# Patient Record
Sex: MALE | Race: WHITE | NOT HISPANIC OR LATINO | URBAN - METROPOLITAN AREA
[De-identification: names, ages, dates, MRNs, and addresses within clinical notes are randomized per-mention and may not be internally consistent; named-entity substitution may affect disease eponyms.]

---

## 2023-05-11 ASSESSMENT — PROMIS GLOBAL HEALTH SCALE
RATE_AVERAGE_FATIGUE: MODERATE
RATE_PHYSICAL_HEALTH: FAIR
CARRYOUT_PHYSICAL_ACTIVITIES: COMPLETELY
CARRYOUT_SOCIAL_ACTIVITIES: GOOD
RATE_GENERAL_HEALTH: GOOD
RATE_MENTAL_HEALTH: GOOD
RATE_QUALITY_OF_LIFE: GOOD
EMOTIONAL_PROBLEMS: OFTEN
RATE_AVERAGE_PAIN: 2
RATE_SOCIAL_SATISFACTION: VERY GOOD

## 2023-05-12 ENCOUNTER — OFFICE VISIT (OUTPATIENT)
Dept: PRIMARY CARE | Facility: CLINIC | Age: 28
End: 2023-05-12
Payer: COMMERCIAL

## 2023-05-12 VITALS — SYSTOLIC BLOOD PRESSURE: 120 MMHG | HEART RATE: 64 BPM | WEIGHT: 156 LBS | DIASTOLIC BLOOD PRESSURE: 80 MMHG

## 2023-05-12 DIAGNOSIS — Z00.00 HEALTH CARE MAINTENANCE: Primary | ICD-10-CM

## 2023-05-12 DIAGNOSIS — F41.8 DEPRESSION WITH ANXIETY: ICD-10-CM

## 2023-05-12 DIAGNOSIS — R07.9 CHEST PAIN, UNSPECIFIED TYPE: ICD-10-CM

## 2023-05-12 PROCEDURE — 90715 TDAP VACCINE 7 YRS/> IM: CPT | Performed by: INTERNAL MEDICINE

## 2023-05-12 PROCEDURE — 99395 PREV VISIT EST AGE 18-39: CPT | Performed by: INTERNAL MEDICINE

## 2023-05-12 PROCEDURE — 90471 IMMUNIZATION ADMIN: CPT | Performed by: INTERNAL MEDICINE

## 2023-05-12 PROCEDURE — 1036F TOBACCO NON-USER: CPT | Performed by: INTERNAL MEDICINE

## 2023-05-12 RX ORDER — FINASTERIDE 1 MG/1
1 TABLET, FILM COATED ORAL DAILY
COMMUNITY

## 2023-05-12 ASSESSMENT — ENCOUNTER SYMPTOMS
SLEEP DISTURBANCE: 0
RHINORRHEA: 0
TROUBLE SWALLOWING: 0
PHOTOPHOBIA: 0
DIARRHEA: 0
ABDOMINAL PAIN: 0
PALPITATIONS: 0
BLOOD IN STOOL: 0
WHEEZING: 0
NECK STIFFNESS: 0
EYE ITCHING: 0
EYE PAIN: 0
SEIZURES: 0
FLANK PAIN: 0
COUGH: 0
CHEST TIGHTNESS: 0
ACTIVITY CHANGE: 0
ANAL BLEEDING: 0
EYE REDNESS: 0
COLOR CHANGE: 0
VOMITING: 0
ARTHRALGIAS: 0
SHORTNESS OF BREATH: 0
EYE DISCHARGE: 0
ADENOPATHY: 0
CONSTIPATION: 0
STRIDOR: 0
CHOKING: 0
DIAPHORESIS: 0
SPEECH DIFFICULTY: 0
FREQUENCY: 0
BACK PAIN: 0
CHILLS: 0
POLYDIPSIA: 0
MYALGIAS: 0
DIZZINESS: 0
DYSURIA: 0
HEADACHES: 0
WOUND: 0
SORE THROAT: 0
FATIGUE: 0
NUMBNESS: 0
VOICE CHANGE: 0
POLYPHAGIA: 0
BRUISES/BLEEDS EASILY: 0
NAUSEA: 0
ABDOMINAL DISTENTION: 0
LIGHT-HEADEDNESS: 0
SINUS PAIN: 0
NECK PAIN: 0
FACIAL SWELLING: 0
TREMORS: 0
FACIAL ASYMMETRY: 0
JOINT SWELLING: 0
SINUS PRESSURE: 0
RECTAL PAIN: 0
HEMATURIA: 0
DIFFICULTY URINATING: 0
WEAKNESS: 0
APPETITE CHANGE: 0

## 2023-05-12 NOTE — PROGRESS NOTES
Subjective   Patient ID: Noe Martinez is a 28 y.o. adult who presents for Annual Exam.    Patient presents for physical exam.  He has been compliant with his diet and exercise  He has been complaining of intermittent chest pain which is positional over the past few months.  He denies any exertional chest pain or shortness of breath..  He reports no palpitations.  He reports  pain in his mid sternum with deep bends or moves a certain way.  He otherwise feels okay.  He denies any headaches, no dizziness, sinus problems, no chest pain or shortness of breath.  Denies abdominal pain no nausea vomiting or diarrhea.  Reports no skin rashes or any new musculoskeletal complaints.  He is taking Propecia for hair loss.  He is expectinga son in December.  He reports no symptoms from depression or anxiety.    Follow-up.Me      Review of Systems   Constitutional:  Negative for activity change, appetite change, chills, diaphoresis and fatigue.   HENT:  Negative for congestion, dental problem, drooling, ear discharge, ear pain, facial swelling, hearing loss, mouth sores, nosebleeds, postnasal drip, rhinorrhea, sinus pressure, sinus pain, sneezing, sore throat, tinnitus, trouble swallowing and voice change.    Eyes:  Negative for photophobia, pain, discharge, redness, itching and visual disturbance.   Respiratory:  Negative for cough, choking, chest tightness, shortness of breath, wheezing and stridor.    Cardiovascular:  Positive for chest pain. Negative for palpitations and leg swelling.   Gastrointestinal:  Negative for abdominal distention, abdominal pain, anal bleeding, blood in stool, constipation, diarrhea, nausea, rectal pain and vomiting.   Endocrine: Negative for cold intolerance, heat intolerance, polydipsia, polyphagia and polyuria.   Genitourinary:  Negative for decreased urine volume, difficulty urinating, dysuria, enuresis, flank pain, frequency, genital sores, hematuria, penile discharge and urgency.    Musculoskeletal:  Negative for arthralgias, back pain, gait problem, joint swelling, myalgias, neck pain and neck stiffness.   Skin:  Negative for color change, pallor, rash and wound.   Neurological:  Negative for dizziness, tremors, seizures, syncope, facial asymmetry, speech difficulty, weakness, light-headedness, numbness and headaches.   Hematological:  Negative for adenopathy. Does not bruise/bleed easily.   Psychiatric/Behavioral:  Negative for sleep disturbance.        Objective   /80   Pulse 64   Wt 70.8 kg (156 lb)     Physical Exam  Constitutional:       General: Noe Martinez is not in acute distress.     Appearance: Normal appearance. Noe Martinez is normal weight. Noe Martinez is not ill-appearing, toxic-appearing or diaphoretic.   HENT:      Head: Normocephalic and atraumatic.      Right Ear: Tympanic membrane, ear canal and external ear normal. There is no impacted cerumen.      Left Ear: Tympanic membrane, ear canal and external ear normal. There is no impacted cerumen.      Nose: Nose normal. No congestion or rhinorrhea.      Mouth/Throat:      Mouth: Mucous membranes are dry.      Pharynx: Oropharynx is clear. No oropharyngeal exudate or posterior oropharyngeal erythema.   Eyes:      General: No scleral icterus.        Right eye: No discharge.         Left eye: No discharge.      Extraocular Movements: Extraocular movements intact.      Pupils: Pupils are equal, round, and reactive to light.   Neck:      Vascular: No carotid bruit.   Cardiovascular:      Rate and Rhythm: Normal rate and regular rhythm.      Heart sounds: No murmur heard.     No friction rub. No gallop.   Pulmonary:      Effort: Pulmonary effort is normal. No respiratory distress.      Breath sounds: Normal breath sounds. No stridor. No wheezing, rhonchi or rales.   Chest:      Chest wall: No tenderness.   Abdominal:      General: There is no distension.      Palpations: There is no mass.      Tenderness: There is no  abdominal tenderness. There is no right CVA tenderness, left CVA tenderness or guarding.      Hernia: No hernia is present.   Genitourinary:     Penis: Normal.       Testes: Normal.   Musculoskeletal:         General: No swelling, tenderness, deformity or signs of injury.      Cervical back: No rigidity or tenderness.      Right lower leg: No edema.      Left lower leg: No edema.   Lymphadenopathy:      Cervical: No cervical adenopathy.   Skin:     Coloration: Skin is not jaundiced or pale.      Findings: No bruising, erythema, lesion or rash.   Neurological:      General: No focal deficit present.      Mental Status: Noe Martinez is alert and oriented to person, place, and time.      Cranial Nerves: No cranial nerve deficit.      Sensory: No sensory deficit.      Motor: No weakness.      Coordination: Coordination normal.      Gait: Gait normal.      Deep Tendon Reflexes: Reflexes normal.   Psychiatric:         Mood and Affect: Mood normal.         Behavior: Behavior normal.         Thought Content: Thought content normal.         Judgment: Judgment normal.         Assessment/Plan   Diagnoses and all orders for this visit:  Health care maintenance-we will give a tetanus shot today.  Patient educated about diet and exercise.  He will schedule dermatology appointment dental appointment has been done  -     Albumin , Urine Random; Future  -     Vitamin D, Total; Future  -     CBC and Auto Differential; Future  -     Comprehensive Metabolic Panel; Future  -     TSH with reflex to Free T4 if abnormal; Future  -     Uric Acid; Future  -     Urinalysis with Reflex Microscopic; Future  -     Lipid Panel; Future  -     Tdap vaccine, age 10 years and older  (BOOSTRIX)  Chest pain, unspecified type-chest pain-we will check an x-ray.  -     XR chest 2 views; Future  Depression with anxiety-he denies any symptoms

## 2023-05-12 NOTE — PATIENT INSTRUCTIONS
Please obtain an x-ray.  Obtain fasting blood work and urine.  Follow-up in 1 year.  You will be notified of your results.  Diet and exercise.  Schedule dermatology appointment.

## 2023-05-19 ENCOUNTER — LAB (OUTPATIENT)
Dept: LAB | Facility: LAB | Age: 28
End: 2023-05-19
Payer: COMMERCIAL

## 2023-05-19 DIAGNOSIS — Z00.00 HEALTH CARE MAINTENANCE: ICD-10-CM

## 2023-05-19 LAB
ALANINE AMINOTRANSFERASE (SGPT) (U/L) IN SER/PLAS: 12 U/L (ref 10–52)
ALBUMIN (G/DL) IN SER/PLAS: 4.8 G/DL (ref 3.4–5)
ALBUMIN (MG/L) IN URINE: <7 MG/L
ALBUMIN/CREATININE (UG/MG) IN URINE: ABNORMAL UG/MG CRT (ref 0–30)
ALKALINE PHOSPHATASE (U/L) IN SER/PLAS: 43 U/L (ref 33–120)
ANION GAP IN SER/PLAS: 10 MMOL/L (ref 10–20)
APPEARANCE, URINE: CLEAR
ASPARTATE AMINOTRANSFERASE (SGOT) (U/L) IN SER/PLAS: 15 U/L (ref 9–39)
BASOPHILS (10*3/UL) IN BLOOD BY AUTOMATED COUNT: 0.06 X10E9/L (ref 0–0.1)
BASOPHILS/100 LEUKOCYTES IN BLOOD BY AUTOMATED COUNT: 1.3 % (ref 0–2)
BILIRUBIN TOTAL (MG/DL) IN SER/PLAS: 0.8 MG/DL (ref 0–1.2)
BILIRUBIN, URINE: NEGATIVE
BLOOD, URINE: NEGATIVE
CALCIDIOL (25 OH VITAMIN D3) (NG/ML) IN SER/PLAS: 28 NG/ML
CALCIUM (MG/DL) IN SER/PLAS: 9.8 MG/DL (ref 8.6–10.3)
CARBON DIOXIDE, TOTAL (MMOL/L) IN SER/PLAS: 30 MMOL/L (ref 21–32)
CHLORIDE (MMOL/L) IN SER/PLAS: 103 MMOL/L (ref 98–107)
CHOLESTEROL (MG/DL) IN SER/PLAS: 148 MG/DL (ref 0–199)
CHOLESTEROL IN HDL (MG/DL) IN SER/PLAS: 52.1 MG/DL
CHOLESTEROL/HDL RATIO: 2.8
COLOR, URINE: COLORLESS
CREATININE (MG/DL) IN SER/PLAS: 0.8 MG/DL (ref 0.5–1.3)
CREATININE (MG/DL) IN URINE: 13.2 MG/DL (ref 20–370)
EOSINOPHILS (10*3/UL) IN BLOOD BY AUTOMATED COUNT: 0.08 X10E9/L (ref 0–0.7)
EOSINOPHILS/100 LEUKOCYTES IN BLOOD BY AUTOMATED COUNT: 1.8 % (ref 0–6)
ERYTHROCYTE DISTRIBUTION WIDTH (RATIO) BY AUTOMATED COUNT: 12.1 % (ref 11.5–14.5)
ERYTHROCYTE MEAN CORPUSCULAR HEMOGLOBIN CONCENTRATION (G/DL) BY AUTOMATED: 33.6 G/DL (ref 32–36)
ERYTHROCYTE MEAN CORPUSCULAR VOLUME (FL) BY AUTOMATED COUNT: 88 FL (ref 80–100)
ERYTHROCYTES (10*6/UL) IN BLOOD BY AUTOMATED COUNT: 4.84 X10E12/L (ref 4.5–5.9)
GFR MALE: >90 ML/MIN/1.73M2
GLUCOSE (MG/DL) IN SER/PLAS: 74 MG/DL (ref 74–99)
GLUCOSE, URINE: NEGATIVE MG/DL
HEMATOCRIT (%) IN BLOOD BY AUTOMATED COUNT: 42.6 % (ref 41–52)
HEMOGLOBIN (G/DL) IN BLOOD: 14.3 G/DL (ref 13.5–17.5)
IMMATURE GRANULOCYTES/100 LEUKOCYTES IN BLOOD BY AUTOMATED COUNT: 0.2 % (ref 0–0.9)
KETONES, URINE: NEGATIVE MG/DL
LDL: 86 MG/DL (ref 0–99)
LEUKOCYTE ESTERASE, URINE: NEGATIVE
LEUKOCYTES (10*3/UL) IN BLOOD BY AUTOMATED COUNT: 4.5 X10E9/L (ref 4.4–11.3)
LYMPHOCYTES (10*3/UL) IN BLOOD BY AUTOMATED COUNT: 2.2 X10E9/L (ref 1.2–4.8)
LYMPHOCYTES/100 LEUKOCYTES IN BLOOD BY AUTOMATED COUNT: 49.3 % (ref 13–44)
MONOCYTES (10*3/UL) IN BLOOD BY AUTOMATED COUNT: 0.37 X10E9/L (ref 0.1–1)
MONOCYTES/100 LEUKOCYTES IN BLOOD BY AUTOMATED COUNT: 8.3 % (ref 2–10)
NEUTROPHILS (10*3/UL) IN BLOOD BY AUTOMATED COUNT: 1.74 X10E9/L (ref 1.2–7.7)
NEUTROPHILS/100 LEUKOCYTES IN BLOOD BY AUTOMATED COUNT: 39.1 % (ref 40–80)
NITRITE, URINE: NEGATIVE
PH, URINE: 7 (ref 5–8)
PLATELETS (10*3/UL) IN BLOOD AUTOMATED COUNT: 216 X10E9/L (ref 150–450)
POTASSIUM (MMOL/L) IN SER/PLAS: 4.7 MMOL/L (ref 3.5–5.3)
PROTEIN TOTAL: 6.9 G/DL (ref 6.4–8.2)
PROTEIN, URINE: NEGATIVE MG/DL
SODIUM (MMOL/L) IN SER/PLAS: 138 MMOL/L (ref 136–145)
SPECIFIC GRAVITY, URINE: 1 (ref 1–1.03)
THYROTROPIN (MIU/L) IN SER/PLAS BY DETECTION LIMIT <= 0.05 MIU/L: 1.22 MIU/L (ref 0.44–3.98)
TRIGLYCERIDE (MG/DL) IN SER/PLAS: 52 MG/DL (ref 0–149)
URATE (MG/DL) IN SER/PLAS: 5.3 MG/DL (ref 4–7.5)
UREA NITROGEN (MG/DL) IN SER/PLAS: 13 MG/DL (ref 6–23)
UROBILINOGEN, URINE: <2 MG/DL (ref 0–1.9)
VLDL: 10 MG/DL (ref 0–40)

## 2023-05-19 PROCEDURE — 80053 COMPREHEN METABOLIC PANEL: CPT

## 2023-05-19 PROCEDURE — 36415 COLL VENOUS BLD VENIPUNCTURE: CPT

## 2023-05-19 PROCEDURE — 81003 URINALYSIS AUTO W/O SCOPE: CPT

## 2023-05-19 PROCEDURE — 85025 COMPLETE CBC W/AUTO DIFF WBC: CPT

## 2023-05-19 PROCEDURE — 82570 ASSAY OF URINE CREATININE: CPT

## 2023-05-19 PROCEDURE — 84443 ASSAY THYROID STIM HORMONE: CPT

## 2023-05-19 PROCEDURE — 82306 VITAMIN D 25 HYDROXY: CPT

## 2023-05-19 PROCEDURE — 80061 LIPID PANEL: CPT

## 2023-05-19 PROCEDURE — 82043 UR ALBUMIN QUANTITATIVE: CPT

## 2023-05-19 PROCEDURE — 84550 ASSAY OF BLOOD/URIC ACID: CPT

## 2023-07-14 ENCOUNTER — OFFICE VISIT (OUTPATIENT)
Dept: PRIMARY CARE | Facility: CLINIC | Age: 28
End: 2023-07-14
Payer: COMMERCIAL

## 2023-07-14 VITALS — HEIGHT: 71 IN | WEIGHT: 150 LBS | BODY MASS INDEX: 21 KG/M2

## 2023-07-14 DIAGNOSIS — M94.0 COSTOCHONDRITIS: Primary | ICD-10-CM

## 2023-07-14 PROCEDURE — 99213 OFFICE O/P EST LOW 20 MIN: CPT | Performed by: INTERNAL MEDICINE

## 2023-07-14 PROCEDURE — 1036F TOBACCO NON-USER: CPT | Performed by: INTERNAL MEDICINE

## 2023-07-14 RX ORDER — METHYLPREDNISOLONE 4 MG/1
TABLET ORAL
Qty: 21 TABLET | Refills: 0 | Status: SHIPPED | OUTPATIENT
Start: 2023-07-14 | End: 2023-07-21

## 2023-07-14 SDOH — HEALTH STABILITY: PHYSICAL HEALTH: ON AVERAGE, HOW MANY MINUTES DO YOU ENGAGE IN EXERCISE AT THIS LEVEL?: 30 MIN

## 2023-07-14 SDOH — HEALTH STABILITY: PHYSICAL HEALTH: ON AVERAGE, HOW MANY DAYS PER WEEK DO YOU ENGAGE IN MODERATE TO STRENUOUS EXERCISE (LIKE A BRISK WALK)?: 3 DAYS

## 2023-07-14 ASSESSMENT — LIFESTYLE VARIABLES
AUDIT-C TOTAL SCORE: 4
HOW MANY STANDARD DRINKS CONTAINING ALCOHOL DO YOU HAVE ON A TYPICAL DAY: 1 OR 2
HOW OFTEN DO YOU HAVE A DRINK CONTAINING ALCOHOL: 4 OR MORE TIMES A WEEK
HOW OFTEN DO YOU HAVE SIX OR MORE DRINKS ON ONE OCCASION: NEVER
SKIP TO QUESTIONS 9-10: 1

## 2023-07-14 NOTE — PROGRESS NOTES
"Subjective   Patient ID: Noe Martinez is a 28 y.o. adult who presents for Follow-up.    Patient presents for follow-up.  He continues to complain of chest wall pain with certain movements.  He denies exertional chest pain or shortness of breath.  Symptoms only occur with movement.  He states that his chest wall is tender.  He denies any headaches, no dizziness, no chest pain or shortness of breath.  He denies abdominal pain no nausea vomiting or diarrhea.  He reports no new musculoskeletal complaints.         Review of Systems   Cardiovascular:  Positive for chest pain.       Objective   Ht 1.803 m (5' 11\")   Wt 68 kg (150 lb)   BMI 20.92 kg/m²     Physical Exam  Constitutional:       Appearance: Normal appearance.   Cardiovascular:      Rate and Rhythm: Normal rate and regular rhythm.      Heart sounds: No murmur heard.     No gallop.   Pulmonary:      Effort: No respiratory distress.      Breath sounds: No wheezing or rales.   Abdominal:      General: There is no distension.      Palpations: There is no mass.      Tenderness: There is no abdominal tenderness. There is no guarding.   Musculoskeletal:      Right lower leg: No edema.      Left lower leg: No edema.   Neurological:      Mental Status: Noe Martinez is alert.     Tenderness over chest wall.  Reproduces symptoms    Assessment/Plan     .  Diagnoses and all orders for this visit:  Costochondritis  -     methylPREDNISolone (Medrol Dospak) 4 mg tablets; Take as directed on package.  Health maintenance-physical exam has been done.       "

## 2024-04-12 ENCOUNTER — OFFICE VISIT (OUTPATIENT)
Dept: PRIMARY CARE | Facility: CLINIC | Age: 29
End: 2024-04-12
Payer: COMMERCIAL

## 2024-04-12 VITALS
HEART RATE: 52 BPM | SYSTOLIC BLOOD PRESSURE: 118 MMHG | HEIGHT: 72 IN | BODY MASS INDEX: 19.91 KG/M2 | DIASTOLIC BLOOD PRESSURE: 76 MMHG | WEIGHT: 147 LBS

## 2024-04-12 DIAGNOSIS — M54.50 RIGHT LOW BACK PAIN, UNSPECIFIED CHRONICITY, UNSPECIFIED WHETHER SCIATICA PRESENT: ICD-10-CM

## 2024-04-12 DIAGNOSIS — Z00.00 HEALTH CARE MAINTENANCE: Primary | ICD-10-CM

## 2024-04-12 PROCEDURE — 1036F TOBACCO NON-USER: CPT | Performed by: INTERNAL MEDICINE

## 2024-04-12 PROCEDURE — 99395 PREV VISIT EST AGE 18-39: CPT | Performed by: INTERNAL MEDICINE

## 2024-04-12 ASSESSMENT — ENCOUNTER SYMPTOMS
VOICE CHANGE: 0
STRIDOR: 0
BACK PAIN: 0
WOUND: 0
EYE DISCHARGE: 0
HEADACHES: 0
CONSTIPATION: 0
SLEEP DISTURBANCE: 0
SINUS PRESSURE: 0
SORE THROAT: 0
COLOR CHANGE: 0
SPEECH DIFFICULTY: 0
ANAL BLEEDING: 0
NUMBNESS: 0
BRUISES/BLEEDS EASILY: 0
CHILLS: 0
ABDOMINAL PAIN: 0
BLOOD IN STOOL: 0
VOMITING: 0
PALPITATIONS: 0
HEMATURIA: 0
RECTAL PAIN: 0
EYE REDNESS: 0
TREMORS: 0
ADENOPATHY: 0
DYSURIA: 0
JOINT SWELLING: 0
DIZZINESS: 0
LIGHT-HEADEDNESS: 0
MYALGIAS: 0
DIAPHORESIS: 0
ABDOMINAL DISTENTION: 0
SEIZURES: 0
EYE ITCHING: 0
FLANK PAIN: 0
COUGH: 0
UNEXPECTED WEIGHT CHANGE: 1
SHORTNESS OF BREATH: 0
CHEST TIGHTNESS: 0
NECK PAIN: 0
RHINORRHEA: 0
PHOTOPHOBIA: 0
FACIAL SWELLING: 0
ACTIVITY CHANGE: 0
CHOKING: 0
SINUS PAIN: 0
POLYDIPSIA: 0
DIFFICULTY URINATING: 0
ARTHRALGIAS: 0
WHEEZING: 0
APPETITE CHANGE: 0
NAUSEA: 0
FACIAL ASYMMETRY: 0
POLYPHAGIA: 0
EYE PAIN: 0
FREQUENCY: 0
DIARRHEA: 0
TROUBLE SWALLOWING: 0
WEAKNESS: 0
FATIGUE: 0
NECK STIFFNESS: 0

## 2024-04-12 ASSESSMENT — PROMIS GLOBAL HEALTH SCALE
RATE_QUALITY_OF_LIFE: VERY GOOD
CARRYOUT_PHYSICAL_ACTIVITIES: MOSTLY
RATE_AVERAGE_FATIGUE: MODERATE
RATE_PHYSICAL_HEALTH: GOOD
RATE_GENERAL_HEALTH: GOOD
RATE_MENTAL_HEALTH: GOOD
EMOTIONAL_PROBLEMS: SOMETIMES
RATE_AVERAGE_PAIN: 3
RATE_SOCIAL_SATISFACTION: VERY GOOD
CARRYOUT_SOCIAL_ACTIVITIES: VERY GOOD

## 2024-04-12 ASSESSMENT — PAIN SCALES - GENERAL: PAINLEVEL: 4

## 2024-04-12 NOTE — PROGRESS NOTES
Subjective   Patient ID: Noe Martinez is a 29 y.o. adult who presents for Annual Exam.    Patient presents for follow-up.  He has been compliant with his medications, diet but not exercise.  He has changed his diet and has lost weight.  He has been complaining of a 2-month long history of right-sided low back pain with radiation to his buttocks.  He denies any history of trauma.  He denies any bowel or urinary complaints he denies any headaches, no dizziness, no sinus problems, no chest pain or shortness of breath.  He denies abdominal pain no nausea vomiting or diarrhea reports no other new musculoskeletal         Review of Systems   Constitutional:  Positive for unexpected weight change. Negative for activity change, appetite change, chills, diaphoresis and fatigue.   HENT:  Negative for congestion, dental problem, drooling, ear discharge, ear pain, facial swelling, hearing loss, mouth sores, nosebleeds, postnasal drip, rhinorrhea, sinus pressure, sinus pain, sneezing, sore throat, tinnitus, trouble swallowing and voice change.    Eyes:  Negative for photophobia, pain, discharge, redness, itching and visual disturbance.   Respiratory:  Negative for cough, choking, chest tightness, shortness of breath, wheezing and stridor.    Cardiovascular:  Negative for chest pain, palpitations and leg swelling.   Gastrointestinal:  Negative for abdominal distention, abdominal pain, anal bleeding, blood in stool, constipation, diarrhea, nausea, rectal pain and vomiting.   Endocrine: Negative for cold intolerance, heat intolerance, polydipsia, polyphagia and polyuria.   Genitourinary:  Negative for decreased urine volume, difficulty urinating, dysuria, enuresis, flank pain, frequency, genital sores, hematuria and urgency.   Musculoskeletal:  Negative for arthralgias, back pain, gait problem, joint swelling, myalgias, neck pain and neck stiffness.   Skin:  Negative for color change, pallor, rash and wound.   Neurological:   Negative for dizziness, tremors, seizures, syncope, facial asymmetry, speech difficulty, weakness, light-headedness, numbness and headaches.   Hematological:  Negative for adenopathy. Does not bruise/bleed easily.   Psychiatric/Behavioral:  Negative for sleep disturbance.        Objective   /76   Pulse 52   Ht 1.829 m (6')   Wt 66.7 kg (147 lb)   BMI 19.94 kg/m²     Physical Exam  Constitutional:       General: Noe is not in acute distress.     Appearance: Normal appearance. Noe is normal weight. Noe is not ill-appearing, toxic-appearing or diaphoretic.   HENT:      Head: Normocephalic and atraumatic.      Right Ear: Tympanic membrane, ear canal and external ear normal. There is no impacted cerumen.      Left Ear: Tympanic membrane, ear canal and external ear normal. There is no impacted cerumen.      Nose: Nose normal. No congestion or rhinorrhea.      Mouth/Throat:      Mouth: Mucous membranes are dry.      Pharynx: No oropharyngeal exudate or posterior oropharyngeal erythema.   Eyes:      General: No scleral icterus.        Right eye: No discharge.         Left eye: No discharge.      Extraocular Movements: Extraocular movements intact.      Pupils: Pupils are equal, round, and reactive to light.   Neck:      Vascular: No carotid bruit.   Cardiovascular:      Rate and Rhythm: Normal rate and regular rhythm.      Pulses: Normal pulses.      Heart sounds: Normal heart sounds. No murmur heard.     No friction rub. No gallop.   Pulmonary:      Effort: No respiratory distress.      Breath sounds: No stridor. No wheezing, rhonchi or rales.   Chest:      Chest wall: No tenderness.   Abdominal:      General: Abdomen is flat. There is no distension.      Palpations: Abdomen is soft. There is no mass.      Tenderness: There is no abdominal tenderness. There is no right CVA tenderness, left CVA tenderness or guarding.      Hernia: No hernia is present.   Genitourinary:     Penis: Normal.       Testes:  Normal.   Musculoskeletal:         General: No swelling, tenderness, deformity or signs of injury.      Cervical back: No rigidity or tenderness.      Right lower leg: No edema.      Left lower leg: No edema.   Lymphadenopathy:      Cervical: No cervical adenopathy.   Skin:     Coloration: Skin is not jaundiced or pale.      Findings: No bruising, erythema, lesion or rash.   Neurological:      General: No focal deficit present.      Mental Status: Noe is alert and oriented to person, place, and time. Mental status is at baseline.      Cranial Nerves: No cranial nerve deficit.      Sensory: No sensory deficit.      Motor: No weakness.      Coordination: Coordination normal.      Gait: Gait normal.      Deep Tendon Reflexes: Reflexes normal.   Psychiatric:         Mood and Affect: Mood normal.         Behavior: Behavior normal.         Thought Content: Thought content normal.         Judgment: Judgment normal.         Assessment/Plan   Diagnoses and all orders for this visit:  Health care maintenance-eye and dental appointment has been done.  He will schedule dermatology appointment  -     Albumin , Urine Random; Future  -     Vitamin D 25-Hydroxy,Total (for eval of Vitamin D levels); Future  -     CBC and Auto Differential; Future  -     Comprehensive Metabolic Panel; Future  -     TSH with reflex to Free T4 if abnormal; Future  -     Uric Acid; Future  -     Urinalysis with Reflex Microscopic; Future  -     Lipid Panel; Future  -     Hepatitis C antibody; Future  -     Hepatitis B surface antibody; Future  -     PSA; Future  Right low back pain, unspecified chronicity, unspecified whether sciatica present-he will consider physical therapy.  He agrees to x-rays if approved.  -     XR lumbar spine 2-3 views; Future  Weight loss-he has changed his diet.  He will monitor his weight at home.  Encouraged increased p.o. intake.  He will call if he continues to lose weight.

## 2024-04-13 ENCOUNTER — LAB (OUTPATIENT)
Dept: LAB | Facility: LAB | Age: 29
End: 2024-04-13
Payer: COMMERCIAL

## 2024-04-13 DIAGNOSIS — Z00.00 HEALTH CARE MAINTENANCE: ICD-10-CM

## 2024-04-13 LAB
25(OH)D3 SERPL-MCNC: 27 NG/ML (ref 30–100)
ALBUMIN SERPL BCP-MCNC: 4.8 G/DL (ref 3.4–5)
ALP SERPL-CCNC: 40 U/L (ref 33–120)
ALT SERPL W P-5'-P-CCNC: 13 U/L (ref 7–52)
ANION GAP SERPL CALC-SCNC: 13 MMOL/L (ref 10–20)
APPEARANCE UR: CLEAR
AST SERPL W P-5'-P-CCNC: 14 U/L (ref 9–39)
BASOPHILS # BLD AUTO: 0.05 X10*3/UL (ref 0–0.1)
BASOPHILS NFR BLD AUTO: 1.3 %
BILIRUB SERPL-MCNC: 1 MG/DL (ref 0–1.2)
BILIRUB UR STRIP.AUTO-MCNC: NEGATIVE MG/DL
BUN SERPL-MCNC: 17 MG/DL (ref 6–23)
CALCIUM SERPL-MCNC: 10.4 MG/DL (ref 8.6–10.6)
CHLORIDE SERPL-SCNC: 103 MMOL/L (ref 98–107)
CHOLEST SERPL-MCNC: 168 MG/DL (ref 0–199)
CHOLESTEROL/HDL RATIO: 3
CO2 SERPL-SCNC: 30 MMOL/L (ref 21–32)
COLOR UR: COLORLESS
CREAT SERPL-MCNC: 0.83 MG/DL (ref 0.5–1.3)
CREAT UR-MCNC: 62.9 MG/DL (ref 20–370)
EGFRCR SERPLBLD CKD-EPI 2021: >90 ML/MIN/1.73M*2
EOSINOPHIL # BLD AUTO: 0.11 X10*3/UL (ref 0–0.7)
EOSINOPHIL NFR BLD AUTO: 2.8 %
ERYTHROCYTE [DISTWIDTH] IN BLOOD BY AUTOMATED COUNT: 12.1 % (ref 11.5–14.5)
GLUCOSE SERPL-MCNC: 76 MG/DL (ref 74–99)
GLUCOSE UR STRIP.AUTO-MCNC: NORMAL MG/DL
HBV SURFACE AB SER-ACNC: <3.1 MIU/ML
HCT VFR BLD AUTO: 42.5 % (ref 36–52)
HCV AB SER QL: NONREACTIVE
HDLC SERPL-MCNC: 56.7 MG/DL
HGB BLD-MCNC: 14.1 G/DL (ref 12–17.5)
IMM GRANULOCYTES # BLD AUTO: 0.01 X10*3/UL (ref 0–0.7)
IMM GRANULOCYTES NFR BLD AUTO: 0.3 % (ref 0–0.9)
KETONES UR STRIP.AUTO-MCNC: NEGATIVE MG/DL
LDLC SERPL CALC-MCNC: 99 MG/DL
LEUKOCYTE ESTERASE UR QL STRIP.AUTO: NEGATIVE
LYMPHOCYTES # BLD AUTO: 1.99 X10*3/UL (ref 1.2–4.8)
LYMPHOCYTES NFR BLD AUTO: 49.8 %
MCH RBC QN AUTO: 28.5 PG (ref 26–34)
MCHC RBC AUTO-ENTMCNC: 33.2 G/DL (ref 32–36)
MCV RBC AUTO: 86 FL (ref 80–100)
MICROALBUMIN UR-MCNC: <7 MG/L
MICROALBUMIN/CREAT UR: NORMAL MG/G{CREAT}
MONOCYTES # BLD AUTO: 0.32 X10*3/UL (ref 0.1–1)
MONOCYTES NFR BLD AUTO: 8 %
NEUTROPHILS # BLD AUTO: 1.52 X10*3/UL (ref 1.2–7.7)
NEUTROPHILS NFR BLD AUTO: 37.8 %
NITRITE UR QL STRIP.AUTO: NEGATIVE
NON HDL CHOLESTEROL: 111 MG/DL (ref 0–149)
NRBC BLD-RTO: 0 /100 WBCS (ref 0–0)
PH UR STRIP.AUTO: 7 [PH]
PLATELET # BLD AUTO: 194 X10*3/UL (ref 150–450)
POTASSIUM SERPL-SCNC: 4.9 MMOL/L (ref 3.5–5.3)
PROT SERPL-MCNC: 7.1 G/DL (ref 6.4–8.2)
PROT UR STRIP.AUTO-MCNC: NEGATIVE MG/DL
PSA SERPL-MCNC: 0.48 NG/ML
RBC # BLD AUTO: 4.95 X10*6/UL (ref 4–5.9)
RBC # UR STRIP.AUTO: NEGATIVE /UL
SODIUM SERPL-SCNC: 141 MMOL/L (ref 136–145)
SP GR UR STRIP.AUTO: 1.01
TRIGL SERPL-MCNC: 62 MG/DL (ref 0–149)
TSH SERPL-ACNC: 1.11 MIU/L (ref 0.44–3.98)
URATE SERPL-MCNC: 5.3 MG/DL (ref 2.3–7.5)
UROBILINOGEN UR STRIP.AUTO-MCNC: NORMAL MG/DL
VLDL: 12 MG/DL (ref 0–40)
WBC # BLD AUTO: 4 X10*3/UL (ref 4.4–11.3)

## 2024-04-13 PROCEDURE — 80061 LIPID PANEL: CPT

## 2024-04-13 PROCEDURE — 84153 ASSAY OF PSA TOTAL: CPT

## 2024-04-13 PROCEDURE — 84550 ASSAY OF BLOOD/URIC ACID: CPT

## 2024-04-13 PROCEDURE — 86706 HEP B SURFACE ANTIBODY: CPT

## 2024-04-13 PROCEDURE — 81003 URINALYSIS AUTO W/O SCOPE: CPT

## 2024-04-13 PROCEDURE — 80053 COMPREHEN METABOLIC PANEL: CPT

## 2024-04-13 PROCEDURE — 82043 UR ALBUMIN QUANTITATIVE: CPT

## 2024-04-13 PROCEDURE — 36415 COLL VENOUS BLD VENIPUNCTURE: CPT

## 2024-04-13 PROCEDURE — 86803 HEPATITIS C AB TEST: CPT

## 2024-04-13 PROCEDURE — 82306 VITAMIN D 25 HYDROXY: CPT

## 2024-04-13 PROCEDURE — 84443 ASSAY THYROID STIM HORMONE: CPT

## 2024-04-13 PROCEDURE — 82570 ASSAY OF URINE CREATININE: CPT

## 2024-04-13 PROCEDURE — 85025 COMPLETE CBC W/AUTO DIFF WBC: CPT

## 2024-05-17 ENCOUNTER — APPOINTMENT (OUTPATIENT)
Dept: PRIMARY CARE | Facility: CLINIC | Age: 29
End: 2024-05-17
Payer: COMMERCIAL

## 2024-09-10 ENCOUNTER — OFFICE VISIT (OUTPATIENT)
Dept: PRIMARY CARE | Facility: CLINIC | Age: 29
End: 2024-09-10
Payer: COMMERCIAL

## 2024-09-10 VITALS
SYSTOLIC BLOOD PRESSURE: 120 MMHG | DIASTOLIC BLOOD PRESSURE: 68 MMHG | BODY MASS INDEX: 19.91 KG/M2 | WEIGHT: 146.8 LBS | HEART RATE: 80 BPM

## 2024-09-10 DIAGNOSIS — Z23 NEED FOR INFLUENZA VACCINATION: Primary | ICD-10-CM

## 2024-09-10 DIAGNOSIS — I83.812 VARICOSE VEINS OF LEFT LOWER EXTREMITY WITH PAIN: ICD-10-CM

## 2024-09-10 DIAGNOSIS — S86.811A STRAIN OF CALF MUSCLE, RIGHT, INITIAL ENCOUNTER: ICD-10-CM

## 2024-09-10 PROCEDURE — 99214 OFFICE O/P EST MOD 30 MIN: CPT | Performed by: INTERNAL MEDICINE

## 2024-09-10 PROCEDURE — 90656 IIV3 VACC NO PRSV 0.5 ML IM: CPT | Performed by: INTERNAL MEDICINE

## 2024-09-10 PROCEDURE — 90471 IMMUNIZATION ADMIN: CPT | Performed by: INTERNAL MEDICINE

## 2024-09-10 ASSESSMENT — ENCOUNTER SYMPTOMS
EYE PAIN: 0
TROUBLE SWALLOWING: 0
SINUS PRESSURE: 0
SLEEP DISTURBANCE: 0
STRIDOR: 0
TREMORS: 0
DIFFICULTY URINATING: 0
CHEST TIGHTNESS: 0
FACIAL ASYMMETRY: 0
BRUISES/BLEEDS EASILY: 0
JOINT SWELLING: 0
DIAPHORESIS: 0
ADENOPATHY: 0
RHINORRHEA: 0
FACIAL SWELLING: 0
POLYDIPSIA: 0
ABDOMINAL PAIN: 0
MYALGIAS: 0
HEMATURIA: 0
CHOKING: 0
VOICE CHANGE: 0
COUGH: 0
SINUS PAIN: 0
PHOTOPHOBIA: 0
ARTHRALGIAS: 1
WEAKNESS: 0
VOMITING: 0
COLOR CHANGE: 0
NAUSEA: 0
BACK PAIN: 0
NECK STIFFNESS: 0
WOUND: 0
RECTAL PAIN: 0
PALPITATIONS: 0
EYE ITCHING: 0
NECK PAIN: 0
DIARRHEA: 0
LIGHT-HEADEDNESS: 0
WHEEZING: 0
NUMBNESS: 0
HEADACHES: 0
APPETITE CHANGE: 0
CONSTIPATION: 0
FREQUENCY: 0
SORE THROAT: 0
FLANK PAIN: 0
DIZZINESS: 0
FATIGUE: 0
ABDOMINAL DISTENTION: 0
ANAL BLEEDING: 0
SHORTNESS OF BREATH: 0
BLOOD IN STOOL: 0
SEIZURES: 0
EYE DISCHARGE: 0
DYSURIA: 0
ACTIVITY CHANGE: 0
SPEECH DIFFICULTY: 0
POLYPHAGIA: 0
CHILLS: 0
EYE REDNESS: 0

## 2024-09-10 ASSESSMENT — PAIN SCALES - GENERAL: PAINLEVEL: 3

## 2024-09-10 NOTE — PROGRESS NOTES
Subjective   Patient ID: Noe Martinez is a 29 y.o. adult who presents for Ankle Pain (Right x 2weeks, hurts to push off ).    Patient presents for follow-up.  He was skating 2 weeks ago and feels he may have injured his right calf and Achilles.  He has had some pain.  He has rested and put ice on it with minimal relief.  He denies any swelling.  He is also been complaining of occasional tenderness on his varicose vein on his left leg.  He denies any headaches, no dizziness, no sinus problems, no chest pain or shortness of breath.  He denies abdominal pain no nausea vomiting or diarrhea.    Ankle Pain   Pertinent negatives include no numbness.        Review of Systems   Constitutional:  Negative for activity change, appetite change, chills, diaphoresis and fatigue.   HENT:  Negative for congestion, dental problem, drooling, ear discharge, ear pain, facial swelling, hearing loss, mouth sores, nosebleeds, postnasal drip, rhinorrhea, sinus pressure, sinus pain, sneezing, sore throat, tinnitus, trouble swallowing and voice change.    Eyes:  Negative for photophobia, pain, discharge, redness, itching and visual disturbance.   Respiratory:  Negative for cough, choking, chest tightness, shortness of breath, wheezing and stridor.    Cardiovascular:  Negative for chest pain, palpitations and leg swelling.   Gastrointestinal:  Negative for abdominal distention, abdominal pain, anal bleeding, blood in stool, constipation, diarrhea, nausea, rectal pain and vomiting.   Endocrine: Negative for cold intolerance, heat intolerance, polydipsia, polyphagia and polyuria.   Genitourinary:  Negative for decreased urine volume, difficulty urinating, dysuria, enuresis, flank pain, frequency, genital sores, hematuria and urgency.   Musculoskeletal:  Positive for arthralgias. Negative for back pain, gait problem, joint swelling, myalgias, neck pain and neck stiffness.   Skin:  Negative for color change, pallor, rash and wound.    Neurological:  Negative for dizziness, tremors, seizures, syncope, facial asymmetry, speech difficulty, weakness, light-headedness, numbness and headaches.   Hematological:  Negative for adenopathy. Does not bruise/bleed easily.   Psychiatric/Behavioral:  Negative for sleep disturbance.        Objective   Wt 66.6 kg (146 lb 12.8 oz)   BMI 19.91 kg/m²     Physical Exam  Constitutional:       Appearance: Normal appearance.   Cardiovascular:      Rate and Rhythm: Normal rate and regular rhythm.      Heart sounds: No murmur heard.     No gallop.   Pulmonary:      Effort: No respiratory distress.      Breath sounds: No wheezing or rales.   Abdominal:      General: There is no distension.      Palpations: There is no mass.      Tenderness: There is no abdominal tenderness. There is no guarding.   Genitourinary:     Prostate: Normal.   Musculoskeletal:      Right lower leg: Edema (Varicose vein on left.) present.      Left lower leg: No edema.      Comments: Tenderness on right calf and lower leg.  No swelling or increased warmth.  No cord present   Neurological:      Mental Status: Noe is alert.         Assessment/Plan   Diagnoses and all orders for this visit:  Need for influenza vaccination  Strain of calf muscle, right, initial encounter-he will take ibuprofen.  He will consider an appointment with orthopedics.  Varicose veins of left lower extremity with pain-only occasionally and minimally painful.  Will monitor.  Health maintenance-will give a flu shot today.  Schedule complete physical exam.

## 2025-04-15 ENCOUNTER — APPOINTMENT (OUTPATIENT)
Dept: PRIMARY CARE | Facility: CLINIC | Age: 30
End: 2025-04-15
Payer: COMMERCIAL

## 2025-04-15 VITALS
BODY MASS INDEX: 19.23 KG/M2 | HEIGHT: 72 IN | SYSTOLIC BLOOD PRESSURE: 114 MMHG | HEART RATE: 64 BPM | DIASTOLIC BLOOD PRESSURE: 72 MMHG | WEIGHT: 142 LBS

## 2025-04-15 DIAGNOSIS — Z00.00 HEALTH CARE MAINTENANCE: Primary | ICD-10-CM

## 2025-04-15 DIAGNOSIS — F41.8 DEPRESSION WITH ANXIETY: ICD-10-CM

## 2025-04-15 DIAGNOSIS — S86.811A STRAIN OF CALF MUSCLE, RIGHT, INITIAL ENCOUNTER: ICD-10-CM

## 2025-04-15 PROCEDURE — 99395 PREV VISIT EST AGE 18-39: CPT | Performed by: INTERNAL MEDICINE

## 2025-04-15 PROCEDURE — 3008F BODY MASS INDEX DOCD: CPT | Performed by: INTERNAL MEDICINE

## 2025-04-15 PROCEDURE — 1036F TOBACCO NON-USER: CPT | Performed by: INTERNAL MEDICINE

## 2025-04-15 ASSESSMENT — ENCOUNTER SYMPTOMS
CHEST TIGHTNESS: 0
BRUISES/BLEEDS EASILY: 0
RECTAL PAIN: 0
CHOKING: 0
ANAL BLEEDING: 0
EYE ITCHING: 0
FLANK PAIN: 0
FATIGUE: 0
SEIZURES: 0
POLYDIPSIA: 0
POLYPHAGIA: 0
COLOR CHANGE: 0
EYE REDNESS: 0
FREQUENCY: 0
SORE THROAT: 0
BACK PAIN: 0
NECK STIFFNESS: 0
NAUSEA: 0
TREMORS: 0
CONSTIPATION: 0
DIFFICULTY URINATING: 0
SINUS PAIN: 0
TROUBLE SWALLOWING: 0
COUGH: 0
PALPITATIONS: 0
DIZZINESS: 0
PHOTOPHOBIA: 0
EYE PAIN: 0
ABDOMINAL PAIN: 0
WEAKNESS: 0
APPETITE CHANGE: 0
SHORTNESS OF BREATH: 0
CHILLS: 0
ADENOPATHY: 0
DIAPHORESIS: 0
NECK PAIN: 0
FACIAL ASYMMETRY: 0
VOMITING: 0
HEMATURIA: 0
BLOOD IN STOOL: 0
WHEEZING: 0
STRIDOR: 0
JOINT SWELLING: 0
RHINORRHEA: 0
VOICE CHANGE: 0
DYSURIA: 0
WOUND: 0
DIARRHEA: 0
FACIAL SWELLING: 0
SINUS PRESSURE: 0
SLEEP DISTURBANCE: 0
LIGHT-HEADEDNESS: 0
HEADACHES: 0
SPEECH DIFFICULTY: 0
MYALGIAS: 0
ARTHRALGIAS: 0
NUMBNESS: 0
ACTIVITY CHANGE: 0
ABDOMINAL DISTENTION: 0
EYE DISCHARGE: 0

## 2025-04-15 ASSESSMENT — PATIENT HEALTH QUESTIONNAIRE - PHQ9
10. IF YOU CHECKED OFF ANY PROBLEMS, HOW DIFFICULT HAVE THESE PROBLEMS MADE IT FOR YOU TO DO YOUR WORK, TAKE CARE OF THINGS AT HOME, OR GET ALONG WITH OTHER PEOPLE: NOT DIFFICULT AT ALL
SUM OF ALL RESPONSES TO PHQ9 QUESTIONS 1 AND 2: 2
2. FEELING DOWN, DEPRESSED OR HOPELESS: SEVERAL DAYS
1. LITTLE INTEREST OR PLEASURE IN DOING THINGS: SEVERAL DAYS

## 2025-04-15 ASSESSMENT — PAIN SCALES - GENERAL: PAINLEVEL_OUTOF10: 2

## 2025-04-15 NOTE — PATIENT INSTRUCTIONS
Please diet and exercise.  Obtain fasting blood work and urine.  Schedule physical therapy.  Follow-up in 1 year.  Schedule eye and dermatology appointment

## 2025-04-15 NOTE — PROGRESS NOTES
Subjective   Patient ID: Noe Martinez is a 30 y.o. adult who presents for Annual Exam.    Patient presents for physical exam.  He has been compliant with his diet but not exercise.  He is complaining of increased stress from his job.  He overall feels well.  He denies any headaches, no dizziness, no sinus problems, no chest pain or shortness of breath.  Denies abdominal pain no nausea vomiting or diarrhea.  He continues to complain of right calf and Achilles pain.         Review of Systems   Constitutional:  Negative for activity change, appetite change, chills, diaphoresis and fatigue.   HENT:  Negative for congestion, dental problem, drooling, ear discharge, ear pain, facial swelling, hearing loss, mouth sores, nosebleeds, postnasal drip, rhinorrhea, sinus pressure, sinus pain, sneezing, sore throat, tinnitus, trouble swallowing and voice change.    Eyes:  Negative for photophobia, pain, discharge, redness, itching and visual disturbance.   Respiratory:  Negative for cough, choking, chest tightness, shortness of breath, wheezing and stridor.    Cardiovascular:  Negative for chest pain, palpitations and leg swelling.   Gastrointestinal:  Negative for abdominal distention, abdominal pain, anal bleeding, blood in stool, constipation, diarrhea, nausea, rectal pain and vomiting.   Endocrine: Negative for cold intolerance, heat intolerance, polydipsia, polyphagia and polyuria.   Genitourinary:  Negative for decreased urine volume, difficulty urinating, dysuria, enuresis, flank pain, frequency, genital sores, hematuria and urgency.   Musculoskeletal:  Negative for arthralgias (r achilles pain), back pain, gait problem, joint swelling, myalgias, neck pain and neck stiffness.   Skin:  Negative for color change, pallor, rash and wound.   Neurological:  Negative for dizziness, tremors, seizures, syncope, facial asymmetry, speech difficulty, weakness, light-headedness, numbness and headaches.   Hematological:  Negative for  "adenopathy. Does not bruise/bleed easily.   Psychiatric/Behavioral:  Negative for sleep disturbance.        Objective   /72   Pulse 64   Ht 1.816 m (5' 11.5\")   Wt 64.4 kg (142 lb)   BMI 19.53 kg/m²     Physical Exam  Constitutional:       General: Noe is not in acute distress.     Appearance: Normal appearance. Noe is normal weight. Noe is not ill-appearing, toxic-appearing or diaphoretic.   HENT:      Head: Normocephalic and atraumatic.      Right Ear: Tympanic membrane, ear canal and external ear normal. There is no impacted cerumen.      Left Ear: Tympanic membrane, ear canal and external ear normal. There is no impacted cerumen.      Nose: Nose normal. No congestion or rhinorrhea.      Mouth/Throat:      Mouth: Mucous membranes are moist.      Pharynx: Oropharynx is clear. No oropharyngeal exudate or posterior oropharyngeal erythema.   Eyes:      General: No scleral icterus.        Right eye: No discharge.         Left eye: No discharge.      Extraocular Movements: Extraocular movements intact.      Conjunctiva/sclera: Conjunctivae normal.      Pupils: Pupils are equal, round, and reactive to light.   Neck:      Vascular: No carotid bruit.   Cardiovascular:      Rate and Rhythm: Normal rate and regular rhythm.      Pulses: Normal pulses.      Heart sounds: Normal heart sounds. No murmur heard.     No friction rub. No gallop.   Pulmonary:      Effort: Pulmonary effort is normal. No respiratory distress.      Breath sounds: No stridor. No wheezing, rhonchi or rales.   Chest:      Chest wall: No tenderness.   Abdominal:      General: Abdomen is flat. Bowel sounds are normal. There is no distension.      Palpations: Abdomen is soft. There is no mass.      Tenderness: There is no abdominal tenderness. There is no right CVA tenderness, left CVA tenderness or guarding.      Hernia: No hernia is present.   Genitourinary:     Penis: Normal.       Testes: Normal.   Musculoskeletal:         General: No " swelling, tenderness, deformity or signs of injury.      Cervical back: Normal range of motion and neck supple. No rigidity or tenderness.      Right lower leg: No edema.      Left lower leg: No edema.   Lymphadenopathy:      Cervical: No cervical adenopathy.   Skin:     General: Skin is warm.      Coloration: Skin is not jaundiced or pale.      Findings: No bruising, erythema, lesion or rash.   Neurological:      General: No focal deficit present.      Mental Status: Noe is alert and oriented to person, place, and time. Mental status is at baseline.      Cranial Nerves: No cranial nerve deficit.      Sensory: No sensory deficit.      Motor: No weakness.      Coordination: Coordination normal.      Gait: Gait normal.      Deep Tendon Reflexes: Reflexes normal.   Psychiatric:         Mood and Affect: Mood normal.         Behavior: Behavior normal.         Thought Content: Thought content normal.         Judgment: Judgment normal.         Assessment/Plan   Diagnoses and all orders for this visit:  Health care maintenance-dental appointment has been done.  He will schedule eye and dermatology appointment.  Will check routine labs.  Patient educated about diet and exercise  -     CBC and Auto Differential; Future  -     Vitamin D 25-Hydroxy,Total (for eval of Vitamin D levels); Future  -     TSH with reflex to Free T4 if abnormal; Future  -     Uric Acid; Future  -     Urinalysis with Reflex Microscopic; Future  -     Albumin-Creatinine Ratio, Urine Random; Future  -     Comprehensive Metabolic Panel; Future  -     Lipid Panel; Future  -     Albumin-Creatinine Ratio, Urine Random; Future  -     HIV 1/2 Antigen/Antibody Screen with Reflex to Confirmation; Future  Strain of calf muscle, right, initial encounter-will refer for physical therapy  -     Referral to Physical Therapy; Future  Depression with anxiety-counseling given.  Patient states his symptoms are stable.  He is not interested in referral for counseling or  medications at this

## 2025-04-16 LAB
25(OH)D3+25(OH)D2 SERPL-MCNC: 37 NG/ML (ref 30–100)
ALBUMIN SERPL-MCNC: 5.3 G/DL (ref 3.6–5.1)
ALBUMIN/CREAT UR: 2 MG/G CREAT
ALP SERPL-CCNC: 39 U/L (ref 36–130)
ALT SERPL-CCNC: 21 U/L (ref 9–46)
ANION GAP SERPL CALCULATED.4IONS-SCNC: 9 MMOL/L (CALC) (ref 7–17)
APPEARANCE UR: CLEAR
AST SERPL-CCNC: 18 U/L (ref 10–40)
BASOPHILS # BLD AUTO: 42 CELLS/UL (ref 0–200)
BASOPHILS NFR BLD AUTO: 0.9 %
BILIRUB SERPL-MCNC: 1.1 MG/DL (ref 0.2–1.2)
BILIRUB UR QL STRIP: NEGATIVE
BUN SERPL-MCNC: 13 MG/DL (ref 7–25)
CALCIUM SERPL-MCNC: 10.3 MG/DL (ref 8.6–10.3)
CHLORIDE SERPL-SCNC: 104 MMOL/L (ref 98–110)
CHOLEST SERPL-MCNC: 148 MG/DL
CHOLEST/HDLC SERPL: 2.8 (CALC)
CO2 SERPL-SCNC: 28 MMOL/L (ref 20–32)
COLOR UR: YELLOW
CREAT SERPL-MCNC: 0.83 MG/DL (ref 0.6–1.24)
CREAT UR-MCNC: 155 MG/DL (ref 20–320)
EGFRCR SERPLBLD CKD-EPI 2021: 122 ML/MIN/1.73M2
EOSINOPHIL # BLD AUTO: 80 CELLS/UL (ref 15–500)
EOSINOPHIL NFR BLD AUTO: 1.7 %
ERYTHROCYTE [DISTWIDTH] IN BLOOD BY AUTOMATED COUNT: 12.1 % (ref 11–15)
GLUCOSE SERPL-MCNC: 86 MG/DL (ref 65–99)
GLUCOSE UR QL STRIP: NEGATIVE
HCT VFR BLD AUTO: 44.9 % (ref 38.5–50)
HDLC SERPL-MCNC: 53 MG/DL
HGB BLD-MCNC: 15.3 G/DL (ref 13.2–17.1)
HGB UR QL STRIP: NEGATIVE
HIV 1+2 AB+HIV1 P24 AG SERPL QL IA: NORMAL
KETONES UR QL STRIP: ABNORMAL
LDLC SERPL CALC-MCNC: 82 MG/DL (CALC)
LEUKOCYTE ESTERASE UR QL STRIP: NEGATIVE
LYMPHOCYTES # BLD AUTO: 1979 CELLS/UL (ref 850–3900)
LYMPHOCYTES NFR BLD AUTO: 42.1 %
MCH RBC QN AUTO: 30.1 PG (ref 27–33)
MCHC RBC AUTO-ENTMCNC: 34.1 G/DL (ref 32–36)
MCV RBC AUTO: 88.4 FL (ref 80–100)
MICROALBUMIN UR-MCNC: 0.3 MG/DL
MONOCYTES # BLD AUTO: 334 CELLS/UL (ref 200–950)
MONOCYTES NFR BLD AUTO: 7.1 %
NEUTROPHILS # BLD AUTO: 2265 CELLS/UL (ref 1500–7800)
NEUTROPHILS NFR BLD AUTO: 48.2 %
NITRITE UR QL STRIP: NEGATIVE
NONHDLC SERPL-MCNC: 95 MG/DL (CALC)
PH UR STRIP: 7 [PH] (ref 5–8)
PLATELET # BLD AUTO: 247 THOUSAND/UL (ref 140–400)
PMV BLD REES-ECKER: 10.3 FL (ref 7.5–12.5)
POTASSIUM SERPL-SCNC: 4.5 MMOL/L (ref 3.5–5.3)
PROT SERPL-MCNC: 7.8 G/DL (ref 6.1–8.1)
PROT UR QL STRIP: NEGATIVE
RBC # BLD AUTO: 5.08 MILLION/UL (ref 4.2–5.8)
SODIUM SERPL-SCNC: 141 MMOL/L (ref 135–146)
SP GR UR STRIP: 1.02 (ref 1–1.03)
TRIGL SERPL-MCNC: 52 MG/DL
TSH SERPL-ACNC: 0.98 MIU/L (ref 0.4–4.5)
URATE SERPL-MCNC: 5.5 MG/DL (ref 4–8)
WBC # BLD AUTO: 4.7 THOUSAND/UL (ref 3.8–10.8)

## 2025-05-14 NOTE — PROGRESS NOTES
Physical Therapy    Physical Therapy Lower Extremity Evaluation    Patient Name: oNe Martinez  MRN: [id number]  Today's Date: 5/15/2025                          Payor: [employer] / Plan: [employer] / Product Type: *No Product type* /     Reason for Referral: R calf, achilles  General Comment: Visit 1 of 20    Current Problem  Problem List Items Addressed This Visit           ICD-10-CM    Achilles tendinitis, right leg - Primary M76.61    Relevant Orders    Follow Up In Physical Therapy    Right thigh pain M79.651        Precautions  Precautions  Precautions Comment: None       Pain  Pain Assessment: 0-10  0-10 (Numeric) Pain Score: 0 - No pain, but notes tightness   Pain at worst: 4/10    SUBJECTIVE:   Pain location: R achilles insertion and calf, R thigh     Onset: 5/2024. Skateboarding. He did not feel anything at the time however the next day he had some bruising. He experienced weakness. It improved but continues to be persistent   Does not wear orthotics    Pt later noted after ankle measurements were taken that he also has symptoms in his anterior thigh. Not sure if its sciatica.     Reviewed medical hx form     Imaging:  None     Prior level of function:   Previously independent with all activity     Functional limitations:  Anytime he jumps, stairs, basketball, stepping firtst thing in the morning and when he gets up from sitting.     Home setup:  Reviewed and no concern     Work:      Patient stated goal:  To resolve tightness and pain and prevent future episodes     Prior tx:  No PT      OBJECTIVE:    Lower Extremity ROM: (WNL unless documented below) (p=pain)   ROM in Degrees  RIGHT LEFT   Ankle DF 4 0   Ankle PF 55 62   Ankle INV 35 40   Ankle EV 6 9     R hip IR: 20 degrees, pain   R hip ER: 30 degrees   +Fabers R  Significant tightness in R piriformis     Lower Extremity STRENGTH: (WNL unless documented below) (p=pain)   MMT 5/5 max  RIGHT LEFT   Ankle DF 5 4+    Ankle PF 5 5   Ankle INV 5 5   Ankle EV 5 5     Lower Extremity FLEXIBILITY: (WNL unless documented below) (p=pain)  Flexibility  RIGHT LEFT   Hamstring  49 45   Gastroc  Mod tightness Mod tightness   Soleus  Mod tightness  Mod tightness      Balance: SLSB x 10 sec without pain   Posture: supinated foot posture bilaterally   Palpation: TTP at R achilles insertion, No notable Haglands deformity     Outcome Measure:  Other Measures  Lower Extremity Funtional Score (LEFS): 63/80    TREATMENT:  Initial evaluation completed. Issued and reviewed HEP with pt that included:  Access Code: [email or web address]  URL: [email or web address]  Date: 05/15/2025  Prepared by: Marybeth Solis    Program Notes  Refer to Raulgel Achilles Protocol     Exercises  - Gastroc Stretch on Wall  - 1-2 x daily - 7 x weekly - 2 reps - 30 seconds hold  - Soleus Stretch on Wall  - 1-2 x daily - 7 x weekly - 2 reps - 30 seconds  hold  - Calf Mobilization with Small Ball  - 1 x daily - 7 x weekly - 3 sets - 10 reps  - Standing Heel Raises  - 1 x daily - 7 x weekly - 3 sets - 10 reps  - Single Leg Heel Raise  - 1 x daily - 7 x weekly - 3 sets - 10 reps  - Standing Eccentric Heel Raise  - 1 x daily - 7 x weekly - 3 sets - 10 reps    Patient Education  - Achilles Tendinopathy    MDT handout to r/o lumbar px.     ASSESSEMENT  The pt presents with signs and symptoms consistent with the physical therapy diagnosis of R achilles pain. Ongoing for 2 years.  Pt demonstrates notable tightness in bilateral gastroc and soleus. He has some TTP. -Hagcarlos enrique. Also reports R anterior thigh and hip pain. Limited on time so will continue to assess on next visit. Trial of extension to r/o lumbar px provided. The pt will benefit from skilled physical therapy to reduce impairments in order to return to prior level of function, reduce pain, increase strength and ROM and restore gait/balance.     The physical therapy prognosis is good for the patient to  achieve their goals.   The pt tolerated therapy treatment today well with no adverse effects.    Personal factors/barriers to learning that impact therapy include:  Chronicity  Clinical presentation: Stable and/or uncomplicated characteristics  Level of clinical decision making is Low    PLAN  The pt will be seen 1 time(s) a week for 6 weeks.      The pt has been educated about the risks and benefits of physical therapy including manual therapy treatments and gives consent for treatment.     The patient will benefit from physical therapy treatment to include: therapeutic exercises, therapeutic activities, neurological re-education, manual therapy, modalities, and a home exercise program.     Goals:  Active       PT Problem       Reduce pain at worst to 0-2/10 with all functional and recreational activity.        Start:  05/15/25    Expected End:  08/13/25            Increase ROM/flexibility to WFL to perform daily functional activities including walking, running, stairs and other recreational activity without increased pain        Start:  05/15/25    Expected End:  08/13/25            Increase by > or = 1/2 mm grade to improve stair negotiation, walking and jumping tolerance to perform daily tasks without increased pain/compensation         Start:  05/15/25    Expected End:  08/13/25            Pt to score an increase of 10 points or > on LEFS to display overall increased function.         Start:  05/15/25    Expected End:  08/13/25            Patient will demonstrate independence in home program for support of progression       Start:  05/15/25    Expected End:  08/13/25

## 2025-05-15 ENCOUNTER — EVALUATION (OUTPATIENT)
Dept: PHYSICAL THERAPY | Facility: CLINIC | Age: 30
End: 2025-05-15
Payer: COMMERCIAL

## 2025-05-15 DIAGNOSIS — M79.651 RIGHT THIGH PAIN: ICD-10-CM

## 2025-05-15 DIAGNOSIS — M76.61 ACHILLES TENDINITIS, RIGHT LEG: Primary | ICD-10-CM

## 2025-05-15 PROCEDURE — 97161 PT EVAL LOW COMPLEX 20 MIN: CPT | Mod: GP | Performed by: PHYSICAL THERAPIST

## 2025-05-15 PROCEDURE — 97110 THERAPEUTIC EXERCISES: CPT | Mod: GP | Performed by: PHYSICAL THERAPIST

## 2025-05-15 ASSESSMENT — PAIN - FUNCTIONAL ASSESSMENT: PAIN_FUNCTIONAL_ASSESSMENT: 0-10

## 2025-05-15 ASSESSMENT — PAIN SCALES - GENERAL: PAINLEVEL_OUTOF10: 0 - NO PAIN

## 2025-05-20 NOTE — PROGRESS NOTES
Physical Therapy Treatment    Patient Name: Noe Martinez  MRN: [id number]  Today's Date: 5/21/2025  Time Calculation  Start Time: 1535  Stop Time: 1620  Time Calculation (min): 45 min     PT Therapeutic Procedures Time Entry  Manual Therapy Time Entry: 10  Therapeutic Exercise Time Entry: 35                 Payor: [employer] / Plan: [employer] / Product Type: *No Product type* /     Reason for Referral: R calf, achilles  General Comment: Visit 2 of 20    Current Problem:  Problem List Items Addressed This Visit           ICD-10-CM    Achilles tendinitis, right leg M76.61       Subjective   Pt reports he has been stretching.   He noted some tightness in his hip with deep squatting.   Tightness in R calf but not pain   Pain with single leg HR  HEP compliance: yes    Pain:  Pain Assessment: 0-10  0-10 (Numeric) Pain Score: 0 - No pain  Pain location: R heel     Precautions:  Precautions  Precautions Comment: None      Objective   No objective measures taken this visit    Treatment:    Therapeutic exercise  Upright bike x 5 min   HS stretch at steps x 1 min   Incline bd stretch 2 po x 1 min ea   TR 1/2 2x15  Bosu alt lunge x 10 ea   Bosu knee drive -> HR x 10 ea R/L   Cone taps 3 on floor R/L x 2 cycles   Reverse step ups 8 in 2x10 (no UE support)   Fitter balance 2 pos x 1 min (highest setting)   Updated and reviewed HEP to include hip stretching     Next visit as able:   Fwd lunge-SLS- reverse lunge  Bosu T   Bosu squat   Squat to HR (doing at home)   AE hip   SLS KB swing   Fitter      Manual   STM/IASTM to R achilles/calf region      HEP  Access Code: [email or web address]  URL: [email or web address]  Date: 05/21/2025  Prepared by: Marybeth Solis    Program Notes  Refer to [facility] Achilles Protocol     Exercises  - Gastroc Stretch on Wall  - 1-2 x daily - 7 x weekly - 2 reps - 30 seconds hold  - Soleus Stretch on Wall  - 1-2 x daily - 7 x weekly - 2 reps - 30 seconds  hold  -  Calf Mobilization with Small Ball  - 1 x daily - 7 x weekly - 3 sets - 10 reps  - Standing Heel Raises  - 1 x daily - 7 x weekly - 3 sets - 10 reps  - Single Leg Heel Raise  - 1 x daily - 7 x weekly - 3 sets - 10 reps  - Standing Eccentric Heel Raise  - 1 x daily - 7 x weekly - 3 sets - 10 reps  - Supine Piriformis Stretch with Leg Straight  - 1 x daily - 7 x weekly - 2 reps - 30 seconds hold  - Quadruped Piriformis Stretch  - 1 x daily - 7 x weekly - 3 sets - 10 reps - 30 seconds hold  - Supine ITB Stretch with Strap  - 7 x weekly - 2 reps - 30 seconds hold  - Prone Press Up  - 2 x daily - 7 x weekly - 10 reps  - Standing Lumbar Extension  - 7 x weekly - 10 reps - every 2-3 hours frequency   - Seated Correct Posture  - 7 x weekly    Patient Education  - Achilles Tendinopathy    Assessment  Noted that his R DF felt a little tighter than the L.   Based on subjective and objective findings, symptoms do not appear to be consistent with impingement.   He also felt some pain with bosu knee drives on R that he did not have on L (pain was 4/10).   Some TTP in achilles and calf region with manual tx.     Plan  Continue to progress POC as tolerated by patient to improve strength, mobility and overall function    Goals:  Active       PT Problem       Reduce pain at worst to 0-2/10 with all functional and recreational activity.        Start:  05/15/25    Expected End:  08/13/25            Increase ROM/flexibility to WFL to perform daily functional activities including walking, running, stairs and other recreational activity without increased pain        Start:  05/15/25    Expected End:  08/13/25            Increase by > or = 1/2 mm grade to improve stair negotiation, walking and jumping tolerance to perform daily tasks without increased pain/compensation         Start:  05/15/25    Expected End:  08/13/25            Pt to score an increase of 10 points or > on LEFS to display overall increased function.         Start:   05/15/25    Expected End:  08/13/25            Patient will demonstrate independence in home program for support of progression       Start:  05/15/25    Expected End:  08/13/25

## 2025-05-21 ENCOUNTER — TREATMENT (OUTPATIENT)
Dept: PHYSICAL THERAPY | Facility: CLINIC | Age: 30
End: 2025-05-21
Payer: COMMERCIAL

## 2025-05-21 DIAGNOSIS — M76.61 ACHILLES TENDINITIS, RIGHT LEG: ICD-10-CM

## 2025-05-21 PROCEDURE — 97110 THERAPEUTIC EXERCISES: CPT | Mod: GP | Performed by: PHYSICAL THERAPIST

## 2025-05-21 PROCEDURE — 97140 MANUAL THERAPY 1/> REGIONS: CPT | Mod: GP | Performed by: PHYSICAL THERAPIST

## 2025-05-21 ASSESSMENT — PAIN - FUNCTIONAL ASSESSMENT: PAIN_FUNCTIONAL_ASSESSMENT: 0-10

## 2025-05-21 ASSESSMENT — PAIN SCALES - GENERAL: PAINLEVEL_OUTOF10: 0 - NO PAIN

## 2025-05-28 NOTE — PROGRESS NOTES
Physical Therapy Treatment    Patient Name: Noe Martinez  MRN: [id number]  Today's Date: 5/29/2025  Time Calculation  Start Time: 1316  Stop Time: 1358  Time Calculation (min): 42 min     PT Therapeutic Procedures Time Entry  Therapeutic Exercise Time Entry: 28  Therapeutic Massage Time Entry: 13                 Payor: [employer] / Plan: [employer] / Product Type: *No Product type* /     Reason for Referral: R calf, achilles  General Comment: Visit 3 of 20    Current Problem:  Problem List Items Addressed This Visit           ICD-10-CM    Achilles tendinitis, right leg M76.61         Subjective   Pt reports he felt good after his last session.   States that his heel started to hurt more this past week. He cannot think of a reason why.     HEP compliance: yes    Pain:  Pain Assessment: 0-10  0-10 (Numeric) Pain Score: 4  Pain location: R heel     Precautions:  Precautions  Precautions Comment: None      Objective   No objective measures taken this visit    Treatment:    Therapeutic exercise  Upright bike x 5 min   HS stretch at steps x 1 min   Incline bd stretch 2 po x 1 min ea     Toe press 35 lbs 3 po x 10 ea   Soleus HR 1/2 foam 2x15  TR 1/2 foam BTB 2x15  Fwd lunge-SLS- reverse lunge x 5 ea     Bosu alt lunge x 10 ea   Bosu knee drive -> HR x 10 ea R/L   Cone taps 5 on floor R/L x 2 cycles   Reverse step ups 8 in 2x10 (no UE support)   Fitter balance 2 pos x 1 min (highest setting)     Next visit as able:   AE hip YTB  Bosu T   Bosu squat   Squat to HR (doing at home)   SLS KB swing   Fitter      Manual   STM/IASTM to R achilles/calf region      HEP  Access Code: [email or web address]  URL: [email or web address]  Date: 05/21/2025  Prepared by: Marybeth Solis    Program Notes  Refer to [facility]ils[facility]agel Achilles Protocol     Exercises  - Gastroc Stretch on Wall  - 1-2 x daily - 7 x weekly - 2 reps - 30 seconds hold  - Soleus Stretch on Wall  - 1-2 x daily - 7 x weekly - 2 reps - 30  seconds  hold  - Calf Mobilization with Small Ball  - 1 x daily - 7 x weekly - 3 sets - 10 reps  - Standing Heel Raises  - 1 x daily - 7 x weekly - 3 sets - 10 reps  - Single Leg Heel Raise  - 1 x daily - 7 x weekly - 3 sets - 10 reps  - Standing Eccentric Heel Raise  - 1 x daily - 7 x weekly - 3 sets - 10 reps  - Supine Piriformis Stretch with Leg Straight  - 1 x daily - 7 x weekly - 2 reps - 30 seconds hold  - Quadruped Piriformis Stretch  - 1 x daily - 7 x weekly - 3 sets - 10 reps - 30 seconds hold  - Supine ITB Stretch with Strap  - 7 x weekly - 2 reps - 30 seconds hold  - Prone Press Up  - 2 x daily - 7 x weekly - 10 reps  - Standing Lumbar Extension  - 7 x weekly - 10 reps - every 2-3 hours frequency   - Seated Correct Posture  - 7 x weekly    Patient Education  - Achilles Tendinopathy    Assessment  Pt demonstrated improved stability with balance exercises.  Quite a bit of TTP in medial aspect of calf with Hawkgrip tools so I limited the use of this to improve tolerance    Plan  Continue to progress POC as tolerated by patient to improve strength, mobility and overall function    Goals:  Active       PT Problem       Reduce pain at worst to 0-2/10 with all functional and recreational activity.        Start:  05/15/25    Expected End:  08/13/25            Increase ROM/flexibility to WFL to perform daily functional activities including walking, running, stairs and other recreational activity without increased pain        Start:  05/15/25    Expected End:  08/13/25            Increase by > or = 1/2 mm grade to improve stair negotiation, walking and jumping tolerance to perform daily tasks without increased pain/compensation         Start:  05/15/25    Expected End:  08/13/25            Pt to score an increase of 10 points or > on LEFS to display overall increased function.         Start:  05/15/25    Expected End:  08/13/25            Patient will demonstrate independence in home program for support of  progression       Start:  05/15/25    Expected End:  08/13/25

## 2025-05-29 ENCOUNTER — TREATMENT (OUTPATIENT)
Dept: PHYSICAL THERAPY | Facility: CLINIC | Age: 30
End: 2025-05-29
Payer: COMMERCIAL

## 2025-05-29 DIAGNOSIS — M76.61 ACHILLES TENDINITIS, RIGHT LEG: ICD-10-CM

## 2025-05-29 PROCEDURE — 97110 THERAPEUTIC EXERCISES: CPT | Mod: GP | Performed by: PHYSICAL THERAPIST

## 2025-05-29 PROCEDURE — 97124 MASSAGE THERAPY: CPT | Mod: GP | Performed by: PHYSICAL THERAPIST

## 2025-05-29 ASSESSMENT — PAIN - FUNCTIONAL ASSESSMENT: PAIN_FUNCTIONAL_ASSESSMENT: 0-10

## 2025-05-29 ASSESSMENT — PAIN SCALES - GENERAL: PAINLEVEL_OUTOF10: 4

## 2025-06-02 ENCOUNTER — APPOINTMENT (OUTPATIENT)
Dept: PHYSICAL THERAPY | Facility: CLINIC | Age: 30
End: 2025-06-02
Payer: COMMERCIAL

## 2025-06-02 NOTE — PROGRESS NOTES
Physical Therapy Treatment    Patient Name: Noe Martinez  MRN: [id number]  Today's Date: 6/2/2025                          Payor: [employer] / Plan: [employer] / Product Type: *No Product type* /          Current Problem:  Problem List Items Addressed This Visit    None          Subjective   Pt reports he felt good after his last session.   States that his heel started to hurt more this past week. He cannot think of a reason why.     HEP compliance: yes    Pain:     Pain location: R heel     Precautions:         Objective   No objective measures taken this visit    Treatment:    Therapeutic exercise  Upright bike x 5 min   HS stretch at steps x 1 min   Incline bd stretch 2 po x 1 min ea     Toe press 35 lbs 3 po x 10 ea   Soleus HR 1/2 foam 2x15  TR 1/2 foam BTB 2x15  Fwd lunge-SLS- reverse lunge x 5 ea     Bosu alt lunge x 10 ea   Bosu knee drive -> HR x 10 ea R/L   Cone taps 5 on floor R/L x 2 cycles   Reverse step ups 8 in 2x10 (no UE support)   Fitter balance 2 pos x 1 min (highest setting)     Next visit as able:   AE hip YTB  Bosu T   Bosu squat   Squat to HR (doing at home)   SLS KB swing   Fitter      Manual   STM/IASTM to R achilles/calf region      HEP  Access Code: [email or web address]  URL: [email or web address]  Date: 05/21/2025  Prepared by: Marybeth Solis    Program Notes  Refer to Silsethnagel Achilles Protocol     Exercises  - Gastroc Stretch on Wall  - 1-2 x daily - 7 x weekly - 2 reps - 30 seconds hold  - Soleus Stretch on Wall  - 1-2 x daily - 7 x weekly - 2 reps - 30 seconds  hold  - Calf Mobilization with Small Ball  - 1 x daily - 7 x weekly - 3 sets - 10 reps  - Standing Heel Raises  - 1 x daily - 7 x weekly - 3 sets - 10 reps  - Single Leg Heel Raise  - 1 x daily - 7 x weekly - 3 sets - 10 reps  - Standing Eccentric Heel Raise  - 1 x daily - 7 x weekly - 3 sets - 10 reps  - Supine Piriformis Stretch with Leg Straight  - 1 x daily - 7 x weekly - 2 reps - 30  seconds hold  - Quadruped Piriformis Stretch  - 1 x daily - 7 x weekly - 3 sets - 10 reps - 30 seconds hold  - Supine ITB Stretch with Strap  - 7 x weekly - 2 reps - 30 seconds hold  - Prone Press Up  - 2 x daily - 7 x weekly - 10 reps  - Standing Lumbar Extension  - 7 x weekly - 10 reps - every 2-3 hours frequency   - Seated Correct Posture  - 7 x weekly    Patient Education  - Achilles Tendinopathy    Assessment  Pt demonstrated improved stability with balance exercises.  Quite a bit of TTP in medial aspect of calf with Hawkgrip tools so I limited the use of this to improve tolerance    Plan  Continue to progress POC as tolerated by patient to improve strength, mobility and overall function    Goals:  Active       PT Problem       Reduce pain at worst to 0-2/10 with all functional and recreational activity.        Start:  05/15/25    Expected End:  08/13/25            Increase ROM/flexibility to WFL to perform daily functional activities including walking, running, stairs and other recreational activity without increased pain        Start:  05/15/25    Expected End:  08/13/25            Increase by > or = 1/2 mm grade to improve stair negotiation, walking and jumping tolerance to perform daily tasks without increased pain/compensation         Start:  05/15/25    Expected End:  08/13/25            Pt to score an increase of 10 points or > on LEFS to display overall increased function.         Start:  05/15/25    Expected End:  08/13/25            Patient will demonstrate independence in home program for support of progression       Start:  05/15/25    Expected End:  08/13/25

## 2025-06-09 ENCOUNTER — APPOINTMENT (OUTPATIENT)
Dept: PHYSICAL THERAPY | Facility: CLINIC | Age: 30
End: 2025-06-09
Payer: COMMERCIAL

## 2025-08-05 ENCOUNTER — TELEPHONE (OUTPATIENT)
Dept: PRIMARY CARE | Facility: CLINIC | Age: 30
End: 2025-08-05
Payer: COMMERCIAL

## 2025-08-11 DIAGNOSIS — L65.9 ALOPECIA: Primary | ICD-10-CM

## 2025-08-11 RX ORDER — FINASTERIDE 1 MG/1
1 TABLET, FILM COATED ORAL DAILY
Qty: 30 TABLET | Refills: 11 | Status: SHIPPED | OUTPATIENT
Start: 2025-08-11 | End: 2025-08-12 | Stop reason: SDUPTHER

## 2025-08-12 DIAGNOSIS — Z00.00 HEALTH CARE MAINTENANCE: Primary | ICD-10-CM

## 2025-08-12 DIAGNOSIS — L65.9 ALOPECIA: ICD-10-CM

## 2025-08-12 RX ORDER — FINASTERIDE 1 MG/1
1 TABLET, FILM COATED ORAL DAILY
Qty: 30 TABLET | Refills: 11 | Status: SHIPPED | OUTPATIENT
Start: 2025-08-12 | End: 2026-08-12